# Patient Record
Sex: FEMALE | URBAN - METROPOLITAN AREA
[De-identification: names, ages, dates, MRNs, and addresses within clinical notes are randomized per-mention and may not be internally consistent; named-entity substitution may affect disease eponyms.]

---

## 2023-04-03 ENCOUNTER — APPOINTMENT (RX ONLY)
Dept: URBAN - METROPOLITAN AREA CLINIC 76 | Facility: CLINIC | Age: 28
Setting detail: DERMATOLOGY
End: 2023-04-03

## 2023-04-03 DIAGNOSIS — Z71.89 OTHER SPECIFIED COUNSELING: ICD-10-CM

## 2023-04-03 DIAGNOSIS — Q83.3 ACCESSORY NIPPLE: ICD-10-CM

## 2023-04-03 DIAGNOSIS — L81.3 CAFÉ AU LAIT SPOTS: ICD-10-CM

## 2023-04-03 DIAGNOSIS — D22 MELANOCYTIC NEVI: ICD-10-CM

## 2023-04-03 DIAGNOSIS — L21.8 OTHER SEBORRHEIC DERMATITIS: ICD-10-CM

## 2023-04-03 DIAGNOSIS — D18.0 HEMANGIOMA: ICD-10-CM

## 2023-04-03 DIAGNOSIS — L82.1 OTHER SEBORRHEIC KERATOSIS: ICD-10-CM

## 2023-04-03 DIAGNOSIS — L81.4 OTHER MELANIN HYPERPIGMENTATION: ICD-10-CM

## 2023-04-03 PROBLEM — D22.62 MELANOCYTIC NEVI OF LEFT UPPER LIMB, INCLUDING SHOULDER: Status: ACTIVE | Noted: 2023-04-03

## 2023-04-03 PROBLEM — D22.61 MELANOCYTIC NEVI OF RIGHT UPPER LIMB, INCLUDING SHOULDER: Status: ACTIVE | Noted: 2023-04-03

## 2023-04-03 PROBLEM — D22.5 MELANOCYTIC NEVI OF TRUNK: Status: ACTIVE | Noted: 2023-04-03

## 2023-04-03 PROBLEM — D22.39 MELANOCYTIC NEVI OF OTHER PARTS OF FACE: Status: ACTIVE | Noted: 2023-04-03

## 2023-04-03 PROBLEM — D18.01 HEMANGIOMA OF SKIN AND SUBCUTANEOUS TISSUE: Status: ACTIVE | Noted: 2023-04-03

## 2023-04-03 PROCEDURE — ? PHOTO-DOCUMENTATION

## 2023-04-03 PROCEDURE — ? SUNSCREEN RECOMMENDATIONS

## 2023-04-03 PROCEDURE — ? OBSERVATION AND MEASURE

## 2023-04-03 PROCEDURE — ? COUNSELING

## 2023-04-03 PROCEDURE — 99203 OFFICE O/P NEW LOW 30 MIN: CPT

## 2023-04-03 PROCEDURE — ? TREATMENT REGIMEN

## 2023-04-03 ASSESSMENT — LOCATION ZONE DERM
LOCATION ZONE: ARM
LOCATION ZONE: FACE
LOCATION ZONE: TRUNK
LOCATION ZONE: SCALP

## 2023-04-03 ASSESSMENT — LOCATION SIMPLE DESCRIPTION DERM
LOCATION SIMPLE: LEFT UPPER BACK
LOCATION SIMPLE: RIGHT FOREARM
LOCATION SIMPLE: RIGHT CHEEK
LOCATION SIMPLE: SCALP
LOCATION SIMPLE: RIGHT UPPER BACK
LOCATION SIMPLE: ABDOMEN
LOCATION SIMPLE: LEFT SHOULDER

## 2023-04-03 ASSESSMENT — LOCATION DETAILED DESCRIPTION DERM
LOCATION DETAILED: RIGHT SUPERIOR PARIETAL SCALP
LOCATION DETAILED: LEFT ANTERIOR SHOULDER
LOCATION DETAILED: RIGHT SUPERIOR LATERAL UPPER BACK
LOCATION DETAILED: LEFT RIB CAGE
LOCATION DETAILED: LEFT MID-UPPER BACK
LOCATION DETAILED: RIGHT LATERAL MALAR CHEEK
LOCATION DETAILED: RIGHT MEDIAL MALAR CHEEK
LOCATION DETAILED: RIGHT DISTAL DORSAL FOREARM

## 2023-04-03 NOTE — HPI: TOTAL BODY SKIN EXAM (PATIENT REPORTED)
Do You Have Any Concerning Skin Lesions Today?  If No, You Do Not Have To Fill Out The Remainder Of The Questions.: yes
Where Is Your Skin Lesion(S) Of Concern Located?: Right cheek and left lower abdomen

## 2023-04-03 NOTE — PROCEDURE: COUNSELING
Detail Level: Simple
Detail Level: Detailed
Size Of Lesion: 5x3mm
Skin Checks Recommendations: Mineral sunscreen
Sunscreen Recommendations: Mineral based

## 2023-07-03 NOTE — PROCEDURE: OBSERVATION
Detail Level: Detailed
Size Of Lesion: 9x9mm
Size Of Lesion: 2x2mm
Size Of Lesion: 2x1mm
Size Of Lesion: 5x3mm
Size Of Lesion: 11x4mm
Previously Negative (within the last year)

## 2023-07-05 ENCOUNTER — APPOINTMENT (RX ONLY)
Dept: URBAN - METROPOLITAN AREA CLINIC 76 | Facility: CLINIC | Age: 28
Setting detail: DERMATOLOGY
End: 2023-07-05

## 2023-07-05 DIAGNOSIS — I83.9 ASYMPTOMATIC VARICOSE VEINS OF LOWER EXTREMITIES: ICD-10-CM

## 2023-07-05 DIAGNOSIS — L81.4 OTHER MELANIN HYPERPIGMENTATION: ICD-10-CM

## 2023-07-05 PROBLEM — I83.93 ASYMPTOMATIC VARICOSE VEINS OF BILATERAL LOWER EXTREMITIES: Status: ACTIVE | Noted: 2023-07-05

## 2023-07-05 PROCEDURE — 99213 OFFICE O/P EST LOW 20 MIN: CPT

## 2023-07-05 PROCEDURE — ? PHOTO-DOCUMENTATION

## 2023-07-05 PROCEDURE — ? OBSERVATION

## 2023-07-05 PROCEDURE — ? OBSERVATION AND MEASURE

## 2023-07-05 PROCEDURE — ? COUNSELING

## 2023-07-05 ASSESSMENT — LOCATION DETAILED DESCRIPTION DERM
LOCATION DETAILED: LEFT DISTAL CALF
LOCATION DETAILED: RIGHT ANTERIOR PROXIMAL THIGH
LOCATION DETAILED: RIGHT LATERAL MALAR CHEEK

## 2023-07-05 ASSESSMENT — LOCATION ZONE DERM
LOCATION ZONE: LEG
LOCATION ZONE: FACE

## 2023-07-05 ASSESSMENT — LOCATION SIMPLE DESCRIPTION DERM
LOCATION SIMPLE: RIGHT CHEEK
LOCATION SIMPLE: RIGHT THIGH
LOCATION SIMPLE: LEFT CALF

## 2024-04-01 ENCOUNTER — APPOINTMENT (RX ONLY)
Dept: URBAN - METROPOLITAN AREA CLINIC 76 | Facility: CLINIC | Age: 29
Setting detail: DERMATOLOGY
End: 2024-04-01

## 2024-04-01 DIAGNOSIS — Z71.89 OTHER SPECIFIED COUNSELING: ICD-10-CM

## 2024-04-01 DIAGNOSIS — L81.4 OTHER MELANIN HYPERPIGMENTATION: ICD-10-CM

## 2024-04-01 DIAGNOSIS — L81.3 CAFÉ AU LAIT SPOTS: ICD-10-CM

## 2024-04-01 DIAGNOSIS — L82.1 OTHER SEBORRHEIC KERATOSIS: ICD-10-CM | Status: STABLE

## 2024-04-01 DIAGNOSIS — Q83.3 ACCESSORY NIPPLE: ICD-10-CM

## 2024-04-01 DIAGNOSIS — D18.0 HEMANGIOMA: ICD-10-CM

## 2024-04-01 DIAGNOSIS — D22 MELANOCYTIC NEVI: ICD-10-CM | Status: STABLE

## 2024-04-01 PROBLEM — D18.01 HEMANGIOMA OF SKIN AND SUBCUTANEOUS TISSUE: Status: ACTIVE | Noted: 2024-04-01

## 2024-04-01 PROBLEM — D22.39 MELANOCYTIC NEVI OF OTHER PARTS OF FACE: Status: ACTIVE | Noted: 2024-04-01

## 2024-04-01 PROBLEM — D22.5 MELANOCYTIC NEVI OF TRUNK: Status: ACTIVE | Noted: 2024-04-01

## 2024-04-01 PROBLEM — D22.72 MELANOCYTIC NEVI OF LEFT LOWER LIMB, INCLUDING HIP: Status: ACTIVE | Noted: 2024-04-01

## 2024-04-01 PROBLEM — D22.22 MELANOCYTIC NEVI OF LEFT EAR AND EXTERNAL AURICULAR CANAL: Status: ACTIVE | Noted: 2024-04-01

## 2024-04-01 PROBLEM — D22.62 MELANOCYTIC NEVI OF LEFT UPPER LIMB, INCLUDING SHOULDER: Status: ACTIVE | Noted: 2024-04-01

## 2024-04-01 PROBLEM — D22.61 MELANOCYTIC NEVI OF RIGHT UPPER LIMB, INCLUDING SHOULDER: Status: ACTIVE | Noted: 2024-04-01

## 2024-04-01 PROCEDURE — 99213 OFFICE O/P EST LOW 20 MIN: CPT

## 2024-04-01 PROCEDURE — ? PHOTO-DOCUMENTATION

## 2024-04-01 PROCEDURE — ? COUNSELING

## 2024-04-01 PROCEDURE — ? OBSERVATION AND MEASURE

## 2024-04-01 ASSESSMENT — LOCATION SIMPLE DESCRIPTION DERM
LOCATION SIMPLE: LEFT LOWER LEG
LOCATION SIMPLE: LEFT EAR
LOCATION SIMPLE: LEFT UPPER BACK
LOCATION SIMPLE: RIGHT FOREARM
LOCATION SIMPLE: RIGHT UPPER BACK
LOCATION SIMPLE: ABDOMEN
LOCATION SIMPLE: RIGHT CHEEK
LOCATION SIMPLE: LEFT SHOULDER

## 2024-04-01 ASSESSMENT — LOCATION ZONE DERM
LOCATION ZONE: EAR
LOCATION ZONE: ARM
LOCATION ZONE: LEG
LOCATION ZONE: TRUNK
LOCATION ZONE: FACE

## 2024-04-01 ASSESSMENT — LOCATION DETAILED DESCRIPTION DERM
LOCATION DETAILED: LEFT RIB CAGE
LOCATION DETAILED: RIGHT SUPERIOR LATERAL UPPER BACK
LOCATION DETAILED: LEFT LATERAL PROXIMAL CALF
LOCATION DETAILED: LEFT ANTERIOR SHOULDER
LOCATION DETAILED: RIGHT LATERAL MALAR CHEEK
LOCATION DETAILED: LEFT SUPERIOR HELIX
LOCATION DETAILED: RIGHT DISTAL DORSAL FOREARM
LOCATION DETAILED: RIGHT MEDIAL MALAR CHEEK
LOCATION DETAILED: LEFT MID-UPPER BACK

## 2024-04-01 NOTE — PROCEDURE: OBSERVATION
Detail Level: Detailed
Size Of Lesion: 2x2mm
Size Of Lesion: 2x1mm
Size Of Lesion: 9x9mm
Size Of Lesion: 5x3mm
Size Of Lesion: 11x4mm
Size Of Lesion: 3x2mm
Size Of Lesion: 1x1mm

## 2025-04-28 ENCOUNTER — APPOINTMENT (OUTPATIENT)
Dept: URBAN - METROPOLITAN AREA CLINIC 76 | Facility: CLINIC | Age: 30
Setting detail: DERMATOLOGY
End: 2025-04-28

## 2025-04-28 DIAGNOSIS — Q83.3 ACCESSORY NIPPLE: ICD-10-CM

## 2025-04-28 DIAGNOSIS — D22 MELANOCYTIC NEVI: ICD-10-CM | Status: STABLE

## 2025-04-28 DIAGNOSIS — L82.1 OTHER SEBORRHEIC KERATOSIS: ICD-10-CM | Status: UNCHANGED

## 2025-04-28 DIAGNOSIS — D18.0 HEMANGIOMA: ICD-10-CM

## 2025-04-28 DIAGNOSIS — L81.4 OTHER MELANIN HYPERPIGMENTATION: ICD-10-CM

## 2025-04-28 DIAGNOSIS — I83.9 ASYMPTOMATIC VARICOSE VEINS OF LOWER EXTREMITIES: ICD-10-CM

## 2025-04-28 DIAGNOSIS — L81.3 CAFÉ AU LAIT SPOTS: ICD-10-CM

## 2025-04-28 DIAGNOSIS — Z71.89 OTHER SPECIFIED COUNSELING: ICD-10-CM

## 2025-04-28 PROBLEM — D18.01 HEMANGIOMA OF SKIN AND SUBCUTANEOUS TISSUE: Status: ACTIVE | Noted: 2025-04-28

## 2025-04-28 PROBLEM — D22.72 MELANOCYTIC NEVI OF LEFT LOWER LIMB, INCLUDING HIP: Status: ACTIVE | Noted: 2025-04-28

## 2025-04-28 PROBLEM — D22.22 MELANOCYTIC NEVI OF LEFT EAR AND EXTERNAL AURICULAR CANAL: Status: ACTIVE | Noted: 2025-04-28

## 2025-04-28 PROBLEM — D22.62 MELANOCYTIC NEVI OF LEFT UPPER LIMB, INCLUDING SHOULDER: Status: ACTIVE | Noted: 2025-04-28

## 2025-04-28 PROBLEM — D22.39 MELANOCYTIC NEVI OF OTHER PARTS OF FACE: Status: ACTIVE | Noted: 2025-04-28

## 2025-04-28 PROBLEM — I83.93 ASYMPTOMATIC VARICOSE VEINS OF BILATERAL LOWER EXTREMITIES: Status: ACTIVE | Noted: 2025-04-28

## 2025-04-28 PROBLEM — D22.61 MELANOCYTIC NEVI OF RIGHT UPPER LIMB, INCLUDING SHOULDER: Status: ACTIVE | Noted: 2025-04-28

## 2025-04-28 PROBLEM — D22.5 MELANOCYTIC NEVI OF TRUNK: Status: ACTIVE | Noted: 2025-04-28

## 2025-04-28 PROCEDURE — ? COUNSELING

## 2025-04-28 PROCEDURE — 99213 OFFICE O/P EST LOW 20 MIN: CPT

## 2025-04-28 PROCEDURE — ? SUNSCREEN RECOMMENDATIONS

## 2025-04-28 PROCEDURE — ? OBSERVATION

## 2025-04-28 ASSESSMENT — LOCATION SIMPLE DESCRIPTION DERM
LOCATION SIMPLE: RIGHT POSTERIOR THIGH
LOCATION SIMPLE: ABDOMEN
LOCATION SIMPLE: LEFT SHOULDER
LOCATION SIMPLE: LEFT EAR
LOCATION SIMPLE: LEFT LOWER LEG
LOCATION SIMPLE: RIGHT CHEEK
LOCATION SIMPLE: LEFT POSTERIOR THIGH
LOCATION SIMPLE: LEFT UPPER BACK
LOCATION SIMPLE: RIGHT UPPER BACK
LOCATION SIMPLE: RIGHT FOREARM

## 2025-04-28 ASSESSMENT — LOCATION DETAILED DESCRIPTION DERM
LOCATION DETAILED: RIGHT DISTAL POSTERIOR THIGH
LOCATION DETAILED: RIGHT MEDIAL MALAR CHEEK
LOCATION DETAILED: LEFT DISTAL POSTERIOR THIGH
LOCATION DETAILED: LEFT SUPERIOR HELIX
LOCATION DETAILED: RIGHT DISTAL DORSAL FOREARM
LOCATION DETAILED: LEFT LATERAL PROXIMAL CALF
LOCATION DETAILED: LEFT MID-UPPER BACK
LOCATION DETAILED: LEFT ANTERIOR SHOULDER
LOCATION DETAILED: LEFT RIB CAGE
LOCATION DETAILED: RIGHT LATERAL MALAR CHEEK
LOCATION DETAILED: RIGHT SUPERIOR LATERAL UPPER BACK

## 2025-04-28 ASSESSMENT — LOCATION ZONE DERM
LOCATION ZONE: EAR
LOCATION ZONE: LEG
LOCATION ZONE: FACE
LOCATION ZONE: TRUNK
LOCATION ZONE: ARM

## 2025-04-28 NOTE — PROCEDURE: OBSERVATION
Detail Level: Detailed
Size Of Lesion: 2x2mm
Size Of Lesion: 2x1mm
Size Of Lesion: 3x2mm
Size Of Lesion: 1x1mm
Size Of Lesion: 9x9mm
Size Of Lesion: 5x3mm
Size Of Lesion: 11x4mm
Size Of Lesion In Cm (Optional): 0